# Patient Record
(demographics unavailable — no encounter records)

---

## 2025-02-04 NOTE — PHYSICAL EXAM
[No Acute Distress] : no acute distress [Normal Oropharynx] : normal oropharynx [III] : Mallampati Class: III [Normal Appearance] : normal appearance [No Neck Mass] : no neck mass [Normal Rate/Rhythm] : normal rate/rhythm [Normal S1, S2] : normal s1, s2 [No Murmurs] : no murmurs [No Resp Distress] : no resp distress [No Abnormalities] : no abnormalities [Benign] : benign [Normal Gait] : normal gait [No Clubbing] : no clubbing [No Cyanosis] : no cyanosis [No Edema] : no edema [FROM] : FROM [Normal Color/ Pigmentation] : normal color/ pigmentation [No Focal Deficits] : no focal deficits [Oriented x3] : oriented x3 [Normal Affect] : normal affect [Wheeze] : wheeze [TextBox_2] : ow

## 2025-02-04 NOTE — ASSESSMENT
[FreeTextEntry1] : Mr. ARANGO is a 49 year old male with a history of HTN, HLD, COVID-19 x3, CAD, ow presenting to the office today for an initial pulmonary evaluation for SOB, snoring SEAN  His shortness of breath is multifactorial due to: -over weight  -pulmonary disease   -doubtful -cardiac disease (Kitty)  problem 1: SEAN (risk factors: elevated Mallampati class, snoring, neck size >16, fatigue) -s/p in lab sleep study AHI 71.1 -will [facility]AP [facility] -complete bloodwork to include free and total testosterone level  -Sleep apnea is associated with adverse clinical consequences which can affect most organ systems. Cardiovascular disease risk includes arrhythmias, atrial fibrillation, hypertension, coronary artery disease, and stroke. Metabolic disorders include diabetes type 2, non-alcoholic fatty liver disease. Mood disorder especially depression; and cognitive decline especially in the elderly. Associations with chronic reflux/Ruiz's esophagus some but not all inclusive. -Reasons include arousal consistent with hypopnea; respiratory events most prominent in REM sleep or supine position; therefore sleep staging and body position are important for accurate diagnosis and estimation of AHI.   problem 2: cardiac disease -recommended to continue to follow up with Cardiologist (Kitty)  problem 3: weight management -Weight loss, exercise, and diet control were discussed and are highly encouraged. Treatment options are given such as, aqua therapy, and contacting a nutritionist. Recommended to use the elliptical, stationary bike, less use of treadmill.   problem 4: health maintenance  -recommended yearly flu shot after October 15 -recommended strep pneumonia vaccines: Prevnar-20 vaccine, followed by Pneumo vaccine 23 one year following after 65 years old.  -recommended early intervention for Upper Respiratory Infections (URIs) -recommended regular osteoporosis evaluations -recommended early dermatological evaluations -recommended after the age of 50 to the age of 70, colonoscopy every 5 years  F/U in 6-8 months He is encouraged to call with any changes, concerns, or questions

## 2025-02-04 NOTE — HISTORY OF PRESENT ILLNESS
[TextBox_4] : Mr. ARANGO is a 49year old male with a history of HTN, HLD, COVID-19 x3, CAD, ow presenting to the office today for a follow up visit. His chief complaint is:  -reports he was diagnosed with Covid-19 last week at  -reports he was prescribed and finished a course of Paxlovid -reports still having wheezing and SOB on exertion -reports fatigued -reports he was tested for Sleep apnea  -he denies any headaches, nausea, emesis, fever, chills, sweats, chest pain, chest pressure, coughing, wheezing, palpitations, constipation, diarrhea, vertigo, dysphagia, heartburn, reflux, itchy eyes, itchy ears, or sour taste in the mouth.